# Patient Record
Sex: MALE | Race: BLACK OR AFRICAN AMERICAN | NOT HISPANIC OR LATINO | Employment: UNEMPLOYED | URBAN - METROPOLITAN AREA
[De-identification: names, ages, dates, MRNs, and addresses within clinical notes are randomized per-mention and may not be internally consistent; named-entity substitution may affect disease eponyms.]

---

## 2018-10-16 ENCOUNTER — HOSPITAL ENCOUNTER (EMERGENCY)
Facility: HOSPITAL | Age: 28
Discharge: HOME | End: 2018-10-16
Attending: EMERGENCY MEDICINE
Payer: MEDICAID

## 2018-10-16 VITALS
TEMPERATURE: 98 F | BODY MASS INDEX: 28.25 KG/M2 | SYSTOLIC BLOOD PRESSURE: 149 MMHG | WEIGHT: 180 LBS | HEIGHT: 67 IN | OXYGEN SATURATION: 100 % | DIASTOLIC BLOOD PRESSURE: 90 MMHG | RESPIRATION RATE: 16 BRPM | HEART RATE: 92 BPM

## 2018-10-16 DIAGNOSIS — R03.0 ELEVATED BLOOD PRESSURE READING: ICD-10-CM

## 2018-10-16 DIAGNOSIS — R11.2 NON-INTRACTABLE VOMITING WITH NAUSEA, UNSPECIFIED VOMITING TYPE: ICD-10-CM

## 2018-10-16 DIAGNOSIS — R10.30 LOWER ABDOMINAL PAIN: Primary | ICD-10-CM

## 2018-10-16 DIAGNOSIS — R19.7 DIARRHEA, UNSPECIFIED TYPE: ICD-10-CM

## 2018-10-16 LAB
ALBUMIN SERPL BCP-MCNC: 4.3 G/DL
ALP SERPL-CCNC: 70 U/L
ALT SERPL W/O P-5'-P-CCNC: 46 U/L
ANION GAP SERPL CALC-SCNC: 10 MMOL/L
AST SERPL-CCNC: 25 U/L
BASOPHILS # BLD AUTO: 0.03 K/UL
BASOPHILS NFR BLD: 0.9 %
BILIRUB SERPL-MCNC: 0.9 MG/DL
BILIRUB UR QL STRIP: ABNORMAL
BUN SERPL-MCNC: 11 MG/DL
CALCIUM SERPL-MCNC: 9.6 MG/DL
CHLORIDE SERPL-SCNC: 106 MMOL/L
CLARITY UR: CLEAR
CO2 SERPL-SCNC: 22 MMOL/L
COLOR UR: YELLOW
CREAT SERPL-MCNC: 1.1 MG/DL
DIFFERENTIAL METHOD: ABNORMAL
EOSINOPHIL # BLD AUTO: 0.1 K/UL
EOSINOPHIL NFR BLD: 1.5 %
ERYTHROCYTE [DISTWIDTH] IN BLOOD BY AUTOMATED COUNT: 12.2 %
EST. GFR  (AFRICAN AMERICAN): >60 ML/MIN/1.73 M^2
EST. GFR  (NON AFRICAN AMERICAN): >60 ML/MIN/1.73 M^2
GLUCOSE SERPL-MCNC: 87 MG/DL
GLUCOSE UR QL STRIP: NEGATIVE
HCT VFR BLD AUTO: 52.4 %
HGB BLD-MCNC: 18 G/DL
HGB UR QL STRIP: NEGATIVE
KETONES UR QL STRIP: NEGATIVE
LEUKOCYTE ESTERASE UR QL STRIP: NEGATIVE
LIPASE SERPL-CCNC: 13 U/L
LYMPHOCYTES # BLD AUTO: 1.7 K/UL
LYMPHOCYTES NFR BLD: 51.3 %
MCH RBC QN AUTO: 29.7 PG
MCHC RBC AUTO-ENTMCNC: 34.4 G/DL
MCV RBC AUTO: 86 FL
MONOCYTES # BLD AUTO: 0.2 K/UL
MONOCYTES NFR BLD: 7.2 %
NEUTROPHILS # BLD AUTO: 1.3 K/UL
NEUTROPHILS NFR BLD: 39.1 %
NITRITE UR QL STRIP: NEGATIVE
PH UR STRIP: 8 [PH] (ref 5–8)
PLATELET # BLD AUTO: 236 K/UL
PMV BLD AUTO: 9 FL
POTASSIUM SERPL-SCNC: 3.9 MMOL/L
PROT SERPL-MCNC: 7.6 G/DL
PROT UR QL STRIP: NEGATIVE
RBC # BLD AUTO: 6.07 M/UL
SODIUM SERPL-SCNC: 138 MMOL/L
SP GR UR STRIP: 1.01 (ref 1–1.03)
URN SPEC COLLECT METH UR: ABNORMAL
UROBILINOGEN UR STRIP-ACNC: >=8 EU/DL
WBC # BLD AUTO: 3.35 K/UL

## 2018-10-16 PROCEDURE — 99283 EMERGENCY DEPT VISIT LOW MDM: CPT

## 2018-10-16 PROCEDURE — 80053 COMPREHEN METABOLIC PANEL: CPT

## 2018-10-16 PROCEDURE — 85025 COMPLETE CBC W/AUTO DIFF WBC: CPT

## 2018-10-16 PROCEDURE — 83690 ASSAY OF LIPASE: CPT

## 2018-10-16 PROCEDURE — 81003 URINALYSIS AUTO W/O SCOPE: CPT

## 2018-10-16 RX ORDER — ONDANSETRON 4 MG/1
4 TABLET, ORALLY DISINTEGRATING ORAL EVERY 6 HOURS PRN
Qty: 10 TABLET | Refills: 0 | Status: SHIPPED | OUTPATIENT
Start: 2018-10-16 | End: 2018-11-28

## 2018-10-16 RX ORDER — DICYCLOMINE HYDROCHLORIDE 20 MG/1
20 TABLET ORAL 4 TIMES DAILY PRN
Qty: 20 TABLET | Refills: 0 | Status: SHIPPED | OUTPATIENT
Start: 2018-10-16 | End: 2018-11-15

## 2018-10-16 NOTE — ED NOTES
APPEARANCE: Alert, oriented and in no acute distress.  CARDIAC: Normal rate and rhythm, no murmur heard.   PERIPHERAL VASCULAR: peripheral pulses present. Normal cap refill. No edema. Warm to touch.    RESPIRATORY:Normal rate and effort, breath sounds clear bilaterally throughout chest. Respirations are equal and unlabored no obvious signs of distress.  GASTRO: soft, bowel sounds normal, non distended. RLQ & LLQ tenderness when bending.   MUSC: Full ROM. No bony tenderness or soft tissue tenderness. No obvious deformity.  SKIN: Skin is warm and dry, normal skin turgor, mucous membranes moist.  NEURO: Douglas coma scale: eyes open spontaneously-4, oriented & converses-5, obeys commands-6. No neurological abnormalities.   MENTAL STATUS: awake, alert and aware of environment.  EYE: PERRL, both eyes: pupils brisk and reactive to light. Normal size.  ENT: EARS: no obvious drainage. NOSE: no active bleeding.

## 2018-10-16 NOTE — ED PROVIDER NOTES
Encounter Date: 10/16/2018       History     Chief Complaint   Patient presents with    Abdominal Pain     c/o suprapubic discomfort for the past few weeks    Anxiety     states he has been having episodes of anxiety over the past few weeks    Blister     c/o blisters to the right side of his lips     Source of history:  Patient    The patient presents with nausea, vomiting, and diarrhea that began yesterday.  He also reports intermittent pain across his lower abdomen for 1 week.  These episodes of pain last for 10-20 minutes and resolved without any treatment.  The pain is sharp.  He denies fever and chills.  He denies burning urination, increased urinary frequency, hematuria, difficulty urinating, penile pain, scrotal pain, penile discharge. He has not taking any medications to attempt treatment of the symptoms.  He denies sick contacts.  He denies recent international travel and camping trips.  He has no chronic medical conditions.          Review of patient's allergies indicates:  No Known Allergies  History reviewed. No pertinent past medical history.  History reviewed. No pertinent surgical history.  No family history on file.  Social History     Tobacco Use    Smoking status: Never Smoker   Substance Use Topics    Alcohol use: Yes     Comment: social    Drug use: No     Review of Systems   Constitutional: Negative for chills, fatigue and fever.   HENT: Negative for sore throat and trouble swallowing.    Eyes: Negative for visual disturbance.   Respiratory: Negative for cough and shortness of breath.    Cardiovascular: Negative for chest pain.   Gastrointestinal: Positive for abdominal pain, diarrhea, nausea and vomiting. Negative for blood in stool.   Genitourinary: Negative for decreased urine volume, difficulty urinating, discharge, dysuria, flank pain, frequency, hematuria, penile pain and testicular pain.   Musculoskeletal: Negative for arthralgias and myalgias.   Skin: Negative for rash.        Physical Exam     Initial Vitals [10/16/18 1134]   BP Pulse Resp Temp SpO2   (!) 166/99 84 18 98.6 °F (37 °C) 98 %      MAP       --         Physical Exam    Constitutional: He appears well-developed and well-nourished. He is not diaphoretic.  Non-toxic appearance. He does not appear ill. No distress.   HENT:   Head: Normocephalic and atraumatic.   Mouth/Throat: Oropharynx is clear and moist.   Eyes: No scleral icterus.   Cardiovascular: Normal rate and regular rhythm. Exam reveals no gallop and no friction rub.    No murmur heard.  Pulmonary/Chest: Effort normal and breath sounds normal. He has no wheezes. He has no rhonchi. He has no rales.   Abdominal: Soft. Bowel sounds are normal. There is no tenderness.   Skin: Skin is warm and dry. No pallor.         ED Course   Procedures  Labs Reviewed - No data to display       Imaging Results    None          Medical Decision Making:   Clinical Tests:   Lab Tests: Ordered and Reviewed  Medical decision making:  This patient was evaluated for nausea, vomiting, and diarrhea that began last night.  The symptoms were preceded by intermittent lower abdominal discomfort that began approximately 1 week ago.  The patient is afebrile.  He has no clinical signs of dehydration.  His abdomen is soft and nontender. He has no abdominal distention. He was evaluated with lab work.  He has no leukocytosis, renal insufficiency, electrolyte anomalies, elevation of lipase.  He denied nausea while in the emergency department.  Emergent imaging studies were not felt to be indicated.  Incidentally, the patient was found to have elevated blood pressure.  This could be due to his acute symptomatology or possibly undiagnosed essential hypertension.  I will prescribe Bentyl and Zofran to treat the patient's symptoms. He has been given strict return precautions.  He has been instructed to arrange close follow-up with a primary care provider for recheck of his symptoms and also for recheck of  his blood pressure in a few days.                      Clinical Impression:   The primary encounter diagnosis was Lower abdominal pain. Diagnoses of Non-intractable vomiting with nausea, unspecified vomiting type, Diarrhea, unspecified type, and Elevated blood pressure reading were also pertinent to this visit.      Disposition:   Disposition: Discharged  Condition: Stable                        Flavio Heller III, MD  10/16/18 6103

## 2018-10-16 NOTE — ED TRIAGE NOTES
Pt presents to er with c/o lower abdominal pain that hurts when he bends but not when sitting. Also reports blisters to right side of his lips and anxious. Reports that it started this morning.

## 2018-10-27 ENCOUNTER — HOSPITAL ENCOUNTER (EMERGENCY)
Facility: HOSPITAL | Age: 28
Discharge: HOME | End: 2018-10-27
Attending: EMERGENCY MEDICINE
Payer: MEDICAID

## 2018-10-27 VITALS
RESPIRATION RATE: 23 BRPM | OXYGEN SATURATION: 98 % | DIASTOLIC BLOOD PRESSURE: 67 MMHG | SYSTOLIC BLOOD PRESSURE: 150 MMHG | WEIGHT: 188 LBS | HEART RATE: 91 BPM | HEIGHT: 67 IN | BODY MASS INDEX: 29.51 KG/M2 | TEMPERATURE: 98 F

## 2018-10-27 DIAGNOSIS — I10 HYPERTENSION: ICD-10-CM

## 2018-10-27 LAB
ALBUMIN SERPL BCP-MCNC: 4.2 G/DL
ALP SERPL-CCNC: 61 U/L
ALT SERPL W/O P-5'-P-CCNC: 44 U/L
ANION GAP SERPL CALC-SCNC: 10 MMOL/L
AST SERPL-CCNC: 20 U/L
BILIRUB SERPL-MCNC: 0.6 MG/DL
BUN SERPL-MCNC: 11 MG/DL
CALCIUM SERPL-MCNC: 9.8 MG/DL
CHLORIDE SERPL-SCNC: 104 MMOL/L
CO2 SERPL-SCNC: 26 MMOL/L
CREAT SERPL-MCNC: 1 MG/DL
EST. GFR  (AFRICAN AMERICAN): >60 ML/MIN/1.73 M^2
EST. GFR  (NON AFRICAN AMERICAN): >60 ML/MIN/1.73 M^2
GLUCOSE SERPL-MCNC: 119 MG/DL
POTASSIUM SERPL-SCNC: 3.7 MMOL/L
PROT SERPL-MCNC: 7.3 G/DL
SODIUM SERPL-SCNC: 140 MMOL/L

## 2018-10-27 PROCEDURE — 93010 ELECTROCARDIOGRAM REPORT: CPT | Mod: ,,, | Performed by: INTERNAL MEDICINE

## 2018-10-27 PROCEDURE — 80053 COMPREHEN METABOLIC PANEL: CPT

## 2018-10-27 PROCEDURE — 93005 ELECTROCARDIOGRAM TRACING: CPT

## 2018-10-27 PROCEDURE — 99284 EMERGENCY DEPT VISIT MOD MDM: CPT

## 2018-10-27 RX ORDER — LISINOPRIL 10 MG/1
10 TABLET ORAL DAILY
Qty: 30 TABLET | Refills: 0 | Status: SHIPPED | OUTPATIENT
Start: 2018-10-27 | End: 2019-01-09 | Stop reason: SDUPTHER

## 2018-10-27 NOTE — ED PROVIDER NOTES
Encounter Date: 10/27/2018    SCRIBE #1 NOTE: I, Stephani Ferguson, am scribing for, and in the presence of,  Dr. Markie Cervantes. I have scribed the entire note.       History     Chief Complaint   Patient presents with    Hypertension     elevated blood pressure the past couple days, reports bilateral tingling in hands, denies chest pain and sob     Raza Vieyra is a 28 y.o. male who presents in the ED today with complaints of hypertension. Pt states that he has been monitoring his BP and homes and reports that it measured 166/90 this morning. He confirms that he has taken Lisinopril 10 mg daily the past for his hypertension, but notes that he is not currently on anything for past month as he is re-establishing primary care.  He reports feeling fine right now. Pt mentions recently establishing care w/ a PCP, where he has an appointment in 2 weeks. Denies any PMHx of DM. He denies any chest pain, SOB, or fever. Confirms diarrhea last week and 2x today.       The history is provided by the patient.     Review of patient's allergies indicates:  No Known Allergies  No past medical history on file.  No past surgical history on file.  No family history on file.  Social History     Tobacco Use    Smoking status: Never Smoker   Substance Use Topics    Alcohol use: Yes     Comment: social    Drug use: No     Review of Systems   Cardiovascular:        Worried about elevated BP   All other systems reviewed and are negative.      Physical Exam     Initial Vitals   BP Pulse Resp Temp SpO2   10/27/18 1636 10/27/18 1636 10/27/18 1803 10/27/18 1636 10/27/18 1636   (!) 141/84 (!) 117 (!) 23 97.9 °F (36.6 °C) 98 %      MAP       --                Physical Exam    Nursing note and vitals reviewed.  Constitutional: He appears well-developed and well-nourished. He is not diaphoretic. No distress.   HENT:   Head: Normocephalic and atraumatic.   Mouth/Throat: Oropharynx is clear and moist. No oropharyngeal exudate.   Eyes: Conjunctivae  and EOM are normal. Pupils are equal, round, and reactive to light.   Neck: Normal range of motion. Neck supple.   Cardiovascular: Normal rate, regular rhythm and normal heart sounds. Exam reveals no gallop and no friction rub.    No murmur heard.  Pulmonary/Chest: Breath sounds normal. No respiratory distress.   Abdominal: Soft. He exhibits no distension.   Musculoskeletal: Normal range of motion. He exhibits no edema.   Lymphadenopathy:     He has no cervical adenopathy.   Neurological: He is alert and oriented to person, place, and time. He has normal strength.   Skin: Skin is warm and dry. No rash noted.         ED Course   Procedures  Labs Reviewed   COMPREHENSIVE METABOLIC PANEL - Abnormal; Notable for the following components:       Result Value    Glucose 119 (*)     All other components within normal limits          Imaging Results    None          Medical Decision Making:   Differential Diagnosis:   Differential Diagnosis includes, but is not limited to:  Hypertensive encephalopathy, CVA/TIA, intracranial hemorrhage, MI/ACS, aortic/carotid artery dissection, AAA, hypertensive nephropathy, medication non-compliance, anxiety, drug abuse/intoxication, essential hypertension    ED Management:  Patient with asymptomatic hypertension, initially was tachycardic into the low 100s however heart rate was in the 90s on last reassessment patient is stable for discharge instructed return if these are 70 headache worsening diarrhea blurry vision confusion chest pain or any other concerns recommended he keep his appointment with his PCP.  Will restart him on his lisinopril daily as that is what he was taking previously    After taking into careful account the historical factors and physical exam findings of the patient's presentation today, in conjunction with the empirical and objective data obtained on ED workup, no acute emergent medical condition has been identified. The patient appears to be low risk for an emergent  medical condition and I feel it is safe and appropriate at this time for the patient to be discharged to follow-up as detailed in their discharge instructions for reevaluation and possible continued outpatient workup and management. I have discussed the specifics of the workup with the patient and the patient has verbalized understanding of the details of the workup, the diagnosis, the treatment plan, and the need for outpatient follow-up.  Although the patient has no emergent etiology today this does not preclude the development of an emergent condition so in addition, I have advised the patient that they can return to the ED and/or activate EMS at any time with worsening of their symptoms, change of their symptoms, or with any other medical complaint.  The patient remained comfortable and stable during their visit in the ED.  Discharge and follow-up instructions discussed with the patient who expressed understanding and willingness to comply with my recommendations.                        Clinical Impression:     1. Hypertension          Scribe Attestation I, Markie Cervantes,  personally performed the services described in this documentation. All medical record entries made by the scribe were at my direction and in my presence.  I have reviewed the chart and agree that the record reflects my personal performance and is accurate and complete. Markie Cervantes M.D. 7:44 PM10/27/2018      Disposition:   Disposition: Discharged  Condition: Stable                        Markie Cervantes Jr., MD  10/27/18 1944

## 2018-11-28 ENCOUNTER — HOSPITAL ENCOUNTER (EMERGENCY)
Facility: HOSPITAL | Age: 28
Discharge: HOME | End: 2018-11-28
Attending: EMERGENCY MEDICINE
Payer: MEDICAID

## 2018-11-28 VITALS
OXYGEN SATURATION: 97 % | BODY MASS INDEX: 28.25 KG/M2 | DIASTOLIC BLOOD PRESSURE: 74 MMHG | WEIGHT: 180 LBS | RESPIRATION RATE: 18 BRPM | SYSTOLIC BLOOD PRESSURE: 130 MMHG | HEART RATE: 88 BPM | HEIGHT: 67 IN | TEMPERATURE: 97 F

## 2018-11-28 DIAGNOSIS — H10.13 ALLERGIC CONJUNCTIVITIS OF BOTH EYES: Primary | ICD-10-CM

## 2018-11-28 PROCEDURE — 99283 EMERGENCY DEPT VISIT LOW MDM: CPT

## 2018-11-28 NOTE — ED PROVIDER NOTES
Encounter Date: 11/28/2018       History     Chief Complaint   Patient presents with    Eye Problem     eyes have been red and burning off and on for about 6 months, for last week, burning has increased. denies drainage or blurred vision     29 yo male presents to the ED with complaints of bilateral eye redness and burning x 6 months. Patient states it has been worse over the last week. He recently moved to [place] within the last 6 [place] He has been using visine eye drops without relief. The burning worsens throughout the day. Also admits to mild watering and seasonal allergies. He denies itching, purulent discharge, vision changes, pain, injury, fever, dry eye feeling. He does not wear contacts.       The history is provided by the patient. No  was used.     Review of patient's allergies indicates:  No Known Allergies  Past Medical History:   Diagnosis Date    Hypertension      History reviewed. No pertinent surgical history.  History reviewed. No pertinent family history.  Social History     Tobacco Use    Smoking status: Never Smoker   Substance Use Topics    Alcohol use: Yes     Comment: social    Drug use: No     Review of Systems   HENT: Negative for sneezing.    Eyes: Positive for discharge (water) and redness. Negative for pain, itching and visual disturbance.       Physical Exam     Initial Vitals [11/28/18 1448]   BP Pulse Resp Temp SpO2   130/74 88 18 97 °F (36.1 °C) 97 %      MAP       --         Physical Exam    Nursing note and vitals reviewed.  Constitutional: He appears well-developed and well-nourished. No distress.   HENT:   Head: Normocephalic and atraumatic.   Nose: Nose normal.   Eyes: EOM and lids are normal. Pupils are equal, round, and reactive to light. Right conjunctiva is injected (minimally). Left conjunctiva is injected (minimally).   Eyes appear glossy. No discharge   Neck: Normal range of motion.   Cardiovascular: Normal rate.    Pulmonary/Chest: Effort normal.   Musculoskeletal: Normal range of motion.   Neurological: He is alert and oriented to person, place, and time.   Skin: Skin is warm and dry.   Psychiatric: He has a normal mood and affect. His speech is normal and behavior is normal. Judgment and thought content normal. Cognition and memory are normal.         ED Course   Procedures  Labs Reviewed - No data to display       Imaging Results    None          Medical Decision Making:   Initial Assessment:   29 yo male with bilaterally eye redness and burning x 6 months worsening over the last week. Minimally conjunctival injection bilaterally, eyes appear glossy. No drainage present. Patient appear well and in no obvious distress. Normal visual acuity.  Differential Diagnosis:   Allergic conjunctivitis, dry eye syndrome  ED Management:  Patient will be discharged with instructions to take an OTC antihistamine daily such as Claritin, allegra, or zyrtec. He can also try using visine-A. Referral placed for [facility] as the patient does not have a PCP and requests one. Contact information also given on discharge instructions. Patient states understanding and agreement with treatment plan.                       Clinical Impression:   The encounter diagnosis was Allergic conjunctivitis of both eyes.                             Mayra Avalos PA-C  11/28/18 0947

## 2018-11-28 NOTE — ED TRIAGE NOTES
Pt presents to ED and reports red itchy eyes. Pt states red eyes x6 months and burning that  started x2 weeks and has progressively worse . Pt denies drainage or blurred vision. He states he has been using OTC eye drops with no relief.

## 2018-11-28 NOTE — DISCHARGE INSTRUCTIONS
Take Claritin, zyrtec, or allegra over the counter daily. You can also use visine-A drops. Follow up with PCP or eye doctor.

## 2018-12-19 ENCOUNTER — HOSPITAL ENCOUNTER (EMERGENCY)
Facility: HOSPITAL | Age: 28
Discharge: HOME | End: 2018-12-19
Attending: EMERGENCY MEDICINE
Payer: MEDICAID

## 2018-12-19 VITALS
SYSTOLIC BLOOD PRESSURE: 139 MMHG | BODY MASS INDEX: 28.25 KG/M2 | TEMPERATURE: 99 F | WEIGHT: 180 LBS | RESPIRATION RATE: 18 BRPM | DIASTOLIC BLOOD PRESSURE: 82 MMHG | HEIGHT: 67 IN | HEART RATE: 99 BPM | OXYGEN SATURATION: 97 %

## 2018-12-19 DIAGNOSIS — R05.9 COUGH: ICD-10-CM

## 2018-12-19 PROCEDURE — 99284 EMERGENCY DEPT VISIT MOD MDM: CPT

## 2018-12-19 RX ORDER — CETIRIZINE HYDROCHLORIDE 10 MG/1
10 TABLET ORAL DAILY
Qty: 30 TABLET | Refills: 0 | Status: SHIPPED | OUTPATIENT
Start: 2018-12-19 | End: 2019-01-09

## 2018-12-19 RX ORDER — BENZONATATE 100 MG/1
100 CAPSULE ORAL 3 TIMES DAILY PRN
Qty: 20 CAPSULE | Refills: 0 | Status: SHIPPED | OUTPATIENT
Start: 2018-12-19 | End: 2018-12-29

## 2018-12-19 NOTE — DISCHARGE INSTRUCTIONS
Please follow up with your Primary care doctor for further evaluation of cough, as possibly caused by your blood pressure medication.  Return with any new or worsening symptoms.

## 2018-12-19 NOTE — ED TRIAGE NOTES
Pt presents to ED and reports cough x 2 weeks but has gotten progressively worse over the past 2 weeks. Pt states every time he take a deep breath or talk he has to cough.

## 2018-12-19 NOTE — ED PROVIDER NOTES
Encounter Date: 12/19/2018       History     Chief Complaint   Patient presents with    Cough     cough for two months, worse today.     Raza Vieyra, a 28 y.o. male  has a past medical history of Hypertension.     He presents to the ED for evaluation of cough x 2 months with worsening of symptoms about 2 weeks ago.  Cough is worse at night and has become more productive over the last 2 weeks.  Denies any fevers, nasal congestion or smoking history.  No treatments tried PTA.  Of note, patient states that he started taking lisinopril about one month ago.            The history is provided by the patient.     Review of patient's allergies indicates:  No Known Allergies  Past Medical History:   Diagnosis Date    Hypertension      History reviewed. No pertinent surgical history.  History reviewed. No pertinent family history.  Social History     Tobacco Use    Smoking status: Never Smoker   Substance Use Topics    Alcohol use: Yes     Comment: social    Drug use: No     Review of Systems   HENT: Negative for congestion, trouble swallowing and voice change.    Respiratory: Positive for cough. Negative for shortness of breath.    Cardiovascular: Negative for chest pain.   Musculoskeletal: Negative for arthralgias.   Skin: Negative for color change.   Allergic/Immunologic: Negative for immunocompromised state.   Psychiatric/Behavioral: Negative for agitation.   All other systems reviewed and are negative.      Physical Exam     Initial Vitals [12/19/18 1348]   BP Pulse Resp Temp SpO2   139/82 99 18 98.5 °F (36.9 °C) 97 %      MAP       --         Physical Exam    Nursing note and vitals reviewed.  Constitutional: He appears well-developed and well-nourished.   HENT:   Head: Normocephalic and atraumatic.   Right Ear: External ear normal.   Left Ear: External ear normal.   Nose: Nose normal.   Mouth/Throat: Oropharynx is clear and moist.   Eyes: EOM are normal.   Neck: Normal range of motion. Neck supple.    Cardiovascular: Normal rate and regular rhythm.   Pulmonary/Chest: No respiratory distress. He has wheezes in the left lower field. He has no rhonchi. He has no rales.   Abdominal: Soft. He exhibits no distension. There is no tenderness. There is no rebound.   Musculoskeletal: Normal range of motion. He exhibits no edema or tenderness.   Lymphadenopathy:     He has no cervical adenopathy.   Neurological: He is alert and oriented to person, place, and time. No cranial nerve deficit.   Skin: Skin is warm and dry. No rash and no abscess noted. No erythema.   Psychiatric: He has a normal mood and affect. Thought content normal.         ED Course   Procedures  Labs Reviewed - No data to display       Imaging Results          X-Ray Chest PA And Lateral (In process)                  Medical Decision Making:   Initial Assessment:   Cough x 2 months, worsening over the last 2 weeks  Differential Diagnosis:   Ace cough, PNA, bronchitis   ED Management:  Patient presents to ED for evaluation of cough x 2 months with worsening of symptoms over the last 2 weeks.  Onset of symptoms started before ACE administration, however worsening of symptoms started after.  Slight wheeze noted to LLF.  CXR shows no acute abnormalities.  Patient was given education on importance of following up with PCP for evaluation possibility of cough due to use of ACE-inhibitor.  At this time he will be prescribed a course of antihistamines as well as an antitussive.  He was given instruction on how to contact his assigned PCP and given strict return precautions.  Patient verbalized understanding and is in agreement with plan.                        Clinical Impression:   The encounter diagnosis was Cough.                             Mita Cronin PA-C  12/19/18 1527

## 2019-01-09 ENCOUNTER — OFFICE VISIT (OUTPATIENT)
Dept: FAMILY MEDICINE | Facility: HOSPITAL | Age: 29
End: 2019-01-09
Payer: MEDICAID

## 2019-01-09 ENCOUNTER — LAB VISIT (OUTPATIENT)
Dept: LAB | Facility: HOSPITAL | Age: 29
End: 2019-01-09
Attending: STUDENT IN AN ORGANIZED HEALTH CARE EDUCATION/TRAINING PROGRAM
Payer: MEDICAID

## 2019-01-09 VITALS
WEIGHT: 177.69 LBS | BODY MASS INDEX: 27.89 KG/M2 | DIASTOLIC BLOOD PRESSURE: 84 MMHG | TEMPERATURE: 98 F | SYSTOLIC BLOOD PRESSURE: 145 MMHG | HEART RATE: 88 BPM | HEIGHT: 67 IN

## 2019-01-09 DIAGNOSIS — Z20.2 STD EXPOSURE: ICD-10-CM

## 2019-01-09 DIAGNOSIS — H10.13 ALLERGIC CONJUNCTIVITIS AND RHINITIS, BILATERAL: Primary | ICD-10-CM

## 2019-01-09 DIAGNOSIS — Z23 NEED FOR PROPHYLACTIC VACCINATION AND INOCULATION AGAINST INFLUENZA: ICD-10-CM

## 2019-01-09 DIAGNOSIS — E87.6 HYPOKALEMIA: Primary | ICD-10-CM

## 2019-01-09 DIAGNOSIS — I10 ESSENTIAL HYPERTENSION: Chronic | ICD-10-CM

## 2019-01-09 DIAGNOSIS — J30.9 ALLERGIC CONJUNCTIVITIS AND RHINITIS, BILATERAL: Primary | ICD-10-CM

## 2019-01-09 LAB
ALBUMIN SERPL BCP-MCNC: 4.2 G/DL
ALP SERPL-CCNC: 83 U/L
ALT SERPL W/O P-5'-P-CCNC: 21 U/L
ANION GAP SERPL CALC-SCNC: 10 MMOL/L
AST SERPL-CCNC: 17 U/L
BILIRUB SERPL-MCNC: 0.5 MG/DL
BUN SERPL-MCNC: 13 MG/DL
CALCIUM SERPL-MCNC: 9.7 MG/DL
CHLORIDE SERPL-SCNC: 103 MMOL/L
CO2 SERPL-SCNC: 27 MMOL/L
CREAT SERPL-MCNC: 1 MG/DL
EST. GFR  (AFRICAN AMERICAN): >60 ML/MIN/1.73 M^2
EST. GFR  (NON AFRICAN AMERICAN): >60 ML/MIN/1.73 M^2
GLUCOSE SERPL-MCNC: 101 MG/DL
POTASSIUM SERPL-SCNC: 3.2 MMOL/L
PROT SERPL-MCNC: 7.6 G/DL
SODIUM SERPL-SCNC: 140 MMOL/L

## 2019-01-09 PROCEDURE — 86592 SYPHILIS TEST NON-TREP QUAL: CPT

## 2019-01-09 PROCEDURE — 99213 OFFICE O/P EST LOW 20 MIN: CPT | Mod: 25 | Performed by: STUDENT IN AN ORGANIZED HEALTH CARE EDUCATION/TRAINING PROGRAM

## 2019-01-09 PROCEDURE — 80053 COMPREHEN METABOLIC PANEL: CPT

## 2019-01-09 PROCEDURE — 36415 COLL VENOUS BLD VENIPUNCTURE: CPT

## 2019-01-09 PROCEDURE — 86703 HIV-1/HIV-2 1 RESULT ANTBDY: CPT

## 2019-01-09 PROCEDURE — 80074 ACUTE HEPATITIS PANEL: CPT

## 2019-01-09 PROCEDURE — 90471 IMMUNIZATION ADMIN: CPT

## 2019-01-09 RX ORDER — BENZONATATE 100 MG/1
100 CAPSULE ORAL
COMMUNITY
Start: 2018-08-25 | End: 2019-01-09

## 2019-01-09 RX ORDER — CETIRIZINE HYDROCHLORIDE 10 MG/1
10 TABLET ORAL DAILY
Qty: 90 TABLET | Refills: 4 | Status: SHIPPED | OUTPATIENT
Start: 2019-01-09 | End: 2020-01-09

## 2019-01-09 RX ORDER — POTASSIUM CHLORIDE 750 MG/1
20 TABLET, EXTENDED RELEASE ORAL 2 TIMES DAILY
Qty: 8 TABLET | Refills: 0 | Status: SHIPPED | OUTPATIENT
Start: 2019-01-09 | End: 2019-01-11

## 2019-01-09 RX ORDER — TETRAHYDROZOLINE HCL 0.05 %
1 DROPS OPHTHALMIC (EYE) 3 TIMES DAILY PRN
Qty: 15 ML | Refills: 3 | Status: SHIPPED | OUTPATIENT
Start: 2019-01-09

## 2019-01-09 RX ORDER — FLUTICASONE PROPIONATE 50 MCG
2 SPRAY, SUSPENSION (ML) NASAL
COMMUNITY
Start: 2018-08-25 | End: 2019-01-09

## 2019-01-09 RX ORDER — LISINOPRIL 10 MG/1
10 TABLET ORAL DAILY
Qty: 90 TABLET | Refills: 4 | Status: SHIPPED | OUTPATIENT
Start: 2019-01-09 | End: 2019-02-12

## 2019-01-09 NOTE — PROGRESS NOTES
Subjective:       Patient ID: Raza Vieyra is a 28 y.o. male.    Chief Complaint: Cough and irritated eyes    HPI     29 yo male w/ HTN presents with a cough that is sometimes productive, rhinorrhea, nasal congestion, injected eyes that are irritated.  This happens all months besides the summer.  He has not tried anything for it but was prescribed zyrtec, flonase in the past though he doesn't remember.  He is also wanting to get STD tested.  Not having symptoms, just wants to be screened to be careful.  He's been with one male partner for the past 6 months.    He has taken lisinopril in the past for HTN but is out.    No f/v, problems with constipation, BMs 2x a week    PMHX: HTN  PSHX: None  Allergies: None  Meds: Past lisinopril  Family med Hx:  Mom and Dad- HTN, GM- DM  Social: Never smoker, ETOH 3-4 drinks a week, Drug use none, sex MSM- 1 partner, gonorrhea in the past, does not use protection, [place]    Review of Systems   Constitutional: Negative for diaphoresis and fever.   HENT: Positive for congestion, postnasal drip and sinus pressure.    Eyes: Positive for redness and itching.   Respiratory: Positive for cough. Negative for shortness of breath and wheezing.    Cardiovascular: Negative for chest pain.   Gastrointestinal: Negative for abdominal pain, constipation, diarrhea, nausea and vomiting.   Genitourinary: Negative for dysuria.   Musculoskeletal: Negative for gait problem.   Neurological: Negative for seizures and headaches.   Psychiatric/Behavioral: Negative for sleep disturbance.   All other systems reviewed and are negative.        Objective:      Vitals:    01/09/19 1356   BP: (!) 145/84   Pulse: 88   Temp: 98.4 °F (36.9 °C)        Physical Exam   Constitutional: He is oriented to person, place, and time. He appears well-developed and well-nourished. No distress.   HENT:   Head: Normocephalic and atraumatic.   Mouth/Throat: No oropharyngeal exudate.   Swollen nasal turbinates   Eyes:  EOM are normal. Pupils are equal, round, and reactive to light.   Eyes slightly injected   Neck: Normal range of motion. Neck supple. No JVD present.   Cardiovascular: Normal rate, regular rhythm and normal heart sounds.   No murmur heard.  Pulmonary/Chest: Effort normal and breath sounds normal. No respiratory distress. He has no wheezes. He has no rales.   Abdominal: Soft. Bowel sounds are normal. He exhibits no distension and no mass. There is no tenderness.   Musculoskeletal: Normal range of motion. He exhibits no edema.   Neurological: He is alert and oriented to person, place, and time. No cranial nerve deficit.   Skin: Skin is warm and dry. Capillary refill takes less than 2 seconds. He is not diaphoretic.   Left lower leg with patch of hairless dry skin, no erythema, no drainage; states other spots all over his body sometimes come and go but not currently present   Psychiatric: He has a normal mood and affect. His behavior is normal.   Nursing note and vitals reviewed.            Assessment:       1. Allergic conjunctivitis and rhinitis, bilateral    2. Essential hypertension    3. STD exposure    4. Need for prophylactic vaccination and inoculation against influenza          Plan:       Seasonal allergic rhinitis due to pollen  -     cetirizine (ZYRTEC) 10 MG tablet; Take 1 tablet (10 mg total) by mouth once daily.  Dispense: 90 tabs  -     Education about identifying/avoiding triggers  -     Tetrahydrozoline 0.05% eye drops bilat TID PRN irritation    Essential hypertension  -     lisinopril 10 MG tablet; Take 1 tablet (10 mg total) by mouth once daily.  Dispense: 90 tablet; Refill: 4  -     Comprehensive metabolic panel; Future; Expected date: 01/09/2019    STD exposure  -     Gonococcus, Amplified DNA Urine; Future; Expected date: 01/09/2019  -     RPR; Future; Expected date: 01/09/2019  -     HIV 1/2 Ag/Ab (4th Gen); Future; Expected date: 01/09/2019  -     C. trachomatis/N. gonorrhoeae by AMP DNA  [facility]; Urine  -     HEPATITIS PANEL, ACUTE; Future; Expected date: 01/09/2019    Need for prophylactic vaccination and inoculation against influenza  -     Influenza - Quadrivalent (3 years & older) (PF)     Follow-up in about 3 months (around 4/9/2019).

## 2019-01-10 LAB
HAV IGM SERPL QL IA: NEGATIVE
HBV CORE IGM SERPL QL IA: NEGATIVE
HBV SURFACE AG SERPL QL IA: NEGATIVE
HCV AB SERPL QL IA: NEGATIVE
HIV 1+2 AB+HIV1 P24 AG SERPL QL IA: NEGATIVE
RPR SER QL: NORMAL

## 2019-01-10 NOTE — PROGRESS NOTES
I have reviewed the notes, assessments, and/or procedures performed, I concur with her/his documentation of Raza Vieyra.

## 2019-01-14 ENCOUNTER — TELEPHONE (OUTPATIENT)
Dept: FAMILY MEDICINE | Facility: HOSPITAL | Age: 29
End: 2019-01-14

## 2019-01-14 DIAGNOSIS — Z20.2 EXPOSURE TO SEXUALLY TRANSMITTED DISEASE (STD): Primary | ICD-10-CM

## 2019-01-14 DIAGNOSIS — R05.9 COUGH: ICD-10-CM

## 2019-01-14 RX ORDER — PANTOPRAZOLE SODIUM 20 MG/1
20 TABLET, DELAYED RELEASE ORAL DAILY
Qty: 30 TABLET | Refills: 2 | Status: SHIPPED | OUTPATIENT
Start: 2019-01-14 | End: 2020-01-14

## 2019-01-14 NOTE — TELEPHONE ENCOUNTER
----- Message from Rylee Danielle sent at 1/14/2019  1:51 PM CST -----  PT CALL ASK IF DR FRIEDMAN CAN PLEASE CALL HIM WITH HIS LAB RESULTS

## 2019-02-12 ENCOUNTER — OFFICE VISIT (OUTPATIENT)
Dept: FAMILY MEDICINE | Facility: HOSPITAL | Age: 29
End: 2019-02-12
Attending: SPECIALIST
Payer: MEDICAID

## 2019-02-12 VITALS
WEIGHT: 175.25 LBS | DIASTOLIC BLOOD PRESSURE: 69 MMHG | BODY MASS INDEX: 27.51 KG/M2 | SYSTOLIC BLOOD PRESSURE: 123 MMHG | HEART RATE: 78 BPM | HEIGHT: 67 IN

## 2019-02-12 DIAGNOSIS — I10 ESSENTIAL HYPERTENSION: Chronic | ICD-10-CM

## 2019-02-12 DIAGNOSIS — H10.13 ALLERGIC CONJUNCTIVITIS AND RHINITIS, BILATERAL: Primary | ICD-10-CM

## 2019-02-12 DIAGNOSIS — J30.2 SEASONAL ALLERGIES: ICD-10-CM

## 2019-02-12 DIAGNOSIS — Z20.2 EXPOSURE TO STD: ICD-10-CM

## 2019-02-12 DIAGNOSIS — J30.9 ALLERGIC CONJUNCTIVITIS AND RHINITIS, BILATERAL: Primary | ICD-10-CM

## 2019-02-12 PROCEDURE — 99213 OFFICE O/P EST LOW 20 MIN: CPT | Performed by: STUDENT IN AN ORGANIZED HEALTH CARE EDUCATION/TRAINING PROGRAM

## 2019-02-12 RX ORDER — FLUTICASONE PROPIONATE 50 MCG
1 SPRAY, SUSPENSION (ML) NASAL DAILY
Qty: 16 G | Refills: 10 | Status: SHIPPED | OUTPATIENT
Start: 2019-02-12

## 2019-02-12 RX ORDER — BENZONATATE 100 MG/1
100 CAPSULE ORAL 3 TIMES DAILY PRN
Qty: 90 CAPSULE | Refills: 1 | Status: SHIPPED | OUTPATIENT
Start: 2019-02-12 | End: 2019-02-22

## 2019-02-12 RX ORDER — CODEINE PHOSPHATE AND GUAIFENESIN 10; 100 MG/5ML; MG/5ML
5 SOLUTION ORAL 3 TIMES DAILY PRN
Qty: 118 ML | Refills: 0 | Status: SHIPPED | OUTPATIENT
Start: 2019-02-12 | End: 2019-02-22

## 2019-02-12 RX ORDER — POTASSIUM CHLORIDE 750 MG/1
TABLET, EXTENDED RELEASE ORAL
Refills: 0 | COMMUNITY
Start: 2019-01-09 | End: 2019-02-12

## 2019-02-12 NOTE — PROGRESS NOTES
Subjective:       Patient ID: Raza Vieyra is a 28 y.o. male.    Chief Complaint: Persistent cough    HPI     27 yo male w/ HTN presents with persistent non-productive cough that has not improved despite zyrtec, claritin D, protonix.  Still with intermittent rhinorrhea, itchy eyes.  Cough interferring with sleep.  Did have a fever once last week with a virus that has cleared.    He has taken lisinopril in the past for HTN but is out.    No N/V, problems with constipation, BMs 2x a week     PMHX: HTN  PSHX: None  Allergies: None  Meds: Past lisinopril  Family med Hx:  Mom and Dad- HTN, GM- DM  Social: Never smoker, ETOH 3-4 drinks a week, Drug use none, sex MSM- 1 partner, gonorrhea in the past, does not use protection, [place]    Review of Systems   Constitutional: Negative for diaphoresis and fever.   HENT: Positive for congestion, postnasal drip and sinus pressure.    Eyes: Positive for redness and itching.   Respiratory: Positive for cough. Negative for shortness of breath and wheezing.    Cardiovascular: Negative for chest pain.   Gastrointestinal: Negative for abdominal pain, constipation, diarrhea, nausea and vomiting.   Genitourinary: Negative for dysuria.   Musculoskeletal: Negative for gait problem.   Neurological: Negative for seizures and headaches.   Psychiatric/Behavioral: Negative for sleep disturbance.   All other systems reviewed and are negative.        Objective:      Vitals:    02/12/19 1133   BP: 123/69   Pulse: 78        Physical Exam   Constitutional: He is oriented to person, place, and time. He appears well-developed and well-nourished. No distress.   HENT:   Head: Normocephalic and atraumatic.   Mouth/Throat: No oropharyngeal exudate.   Swollen nasal turbinates   Eyes: EOM are normal. Pupils are equal, round, and reactive to light.   Eyes slightly injected   Neck: Normal range of motion. Neck supple. No JVD present.   Cardiovascular: Normal rate, regular rhythm and normal heart  sounds.   No murmur heard.  Pulmonary/Chest: Effort normal and breath sounds normal. No respiratory distress. He has no wheezes. He has no rales.   Abdominal: Soft. Bowel sounds are normal. He exhibits no distension and no mass. There is no tenderness.   Musculoskeletal: Normal range of motion. He exhibits no edema.   Neurological: He is alert and oriented to person, place, and time. No cranial nerve deficit.   Skin: Skin is warm and dry. Capillary refill takes less than 2 seconds. He is not diaphoretic.   Psychiatric: He has a normal mood and affect. His behavior is normal.   Nursing note and vitals reviewed.        Assessment:       1. Allergic conjunctivitis and rhinitis, bilateral   3. Exposure to STD    4. Seasonal allergies          Plan:       Seasonal allergic rhinitis due to pollen  -     Cont cetirizine (ZYRTEC) 10 MG tablet; Take 1 tablet (10 mg total) by mouth once daily.  Dispense: 90 tabs  -     Flonase prescribed  -     Cough persistent, trial of tesalon pearls and robitussin in codeine    Essential hypertension  -     Cont lisinopril 10 MG tablet; Take 1 tablet (10 mg total) by mouth once daily.  Dispense: 90 tablet; Refill: 4  -     K had been low, was repleted    STD exposure  -     G/C not completed prior.  Will order again  -     All else was negative       Follow-up in about 3 months (around 5/12/2019). or sooner if needed